# Patient Record
Sex: MALE | Race: AMERICAN INDIAN OR ALASKA NATIVE
[De-identification: names, ages, dates, MRNs, and addresses within clinical notes are randomized per-mention and may not be internally consistent; named-entity substitution may affect disease eponyms.]

---

## 2019-03-07 ENCOUNTER — HOSPITAL ENCOUNTER (EMERGENCY)
Dept: HOSPITAL 5 - ED | Age: 49
Discharge: HOME | End: 2019-03-07
Payer: COMMERCIAL

## 2019-03-07 VITALS — DIASTOLIC BLOOD PRESSURE: 76 MMHG | SYSTOLIC BLOOD PRESSURE: 124 MMHG

## 2019-03-07 DIAGNOSIS — L02.215: Primary | ICD-10-CM

## 2019-03-07 NOTE — EMERGENCY DEPARTMENT REPORT
Abscess Boil HPI





- HPI


Chief Complaint: Skin/Abscess/Foreign Body


Stated Complaint: RT SIDE BOIL ON BUTT/PAIN


Time Seen by Provider: 03/07/19 20:20


Duration: 3 Days


Location: Sacral/Pilonidal


History: No Fever, No Pain, No Purulent Drainage, No Numbness, No Foreign Body, 

No Previous History, No Insect Bite


HPI: 48-year-old -American male presents to the emergency room for bolt 

to his buttocks 2 days.  Patient reports he's been using over-the-counter 

medication that is not working.  Patient has taken nothing for pain.  He denies 

any fever or chills no purulent discharge abdominal pain.  Patient has no past 

medical history currently takes no medications on a daily basis and has no known

drug allergies.


Home Medications: 


                                  Previous Rx's











 Medication  Instructions  Recorded  Last Taken  Type


 


Cephalexin [Keflex] 500 mg PO BID #20 capsule 03/07/19 Unknown Rx


 


Ibuprofen [Motrin 600 MG tab] 600 mg PO Q8H PRN #15 tablet 03/07/19 Unknown Rx











Allergies/Adverse Reactions: 


                                    Allergies











Allergy/AdvReac Type Severity Reaction Status Date / Time


 


No Known Allergies Allergy   Unverified 03/07/19 17:42














ED Review of Systems


ROS: 


Stated complaint: RT SIDE BOIL ON BUTT/PAIN


Other details as noted in HPI





Comment: All other systems reviewed and negative


Skin: lesions





ED Past Medical Hx





- Past Medical History


Previous Medical History?: No





- Surgical History


Past Surgical History?: No





- Social History


Smoking Status: Never Smoker


Substance Use Type: None





- Medications


Home Medications: 


                                Home Medications











 Medication  Instructions  Recorded  Confirmed  Last Taken  Type


 


Cephalexin [Keflex] 500 mg PO BID #20 capsule 03/07/19  Unknown Rx


 


Ibuprofen [Motrin 600 MG tab] 600 mg PO Q8H PRN #15 tablet 03/07/19  Unknown Rx














ED Abscess Boil Physical Exam





- Exam


General: 


Vital signs noted. No distress. Alert and acting appropriately.





Size: 3 cm


Exam: Yes Tenderness, Yes Normal Neurologic Exam, Yes Normal Circulation, No 

Fluctuance, No Surrounding Cellulites/Erythema, No Lymphangitis, No Crepitation,

No Heart Murmur





I & D Note





- I & D Note


I & D Note: DATE OF PROCEDURE: 03/07/2019.  PREOPERATIVE DIAGNOSES:  1.soft 

tissue infection perineum.  2.  POSTOPERATIVE DIAGNOSES:  1.  2. .........soft 

tissue infection. Infection appeared to be contained to subcutaneous tissue and 

there was no evidence of necrotizing soft tissue infection including 

myonecrosis.  OPERATION PERFORMED:  Incision and drainage of ..... soft tissue 

abscess.  Provider: Venkat Salomon PA-C.  ANESTHESIA: Local.  DESCRIPTION OF 

PROCEDURE: The patient was prepped and draped. Seropurulent, somewhat bloody 

fluid was noted. The infection appeared contained to a golf ping pong sized area

in the subcutaneous tissues above the fascia. There was no evidence of 

myonecrosis, penetration of the fascia or significant extent along the fascia of

the infection. We cleaned the area with Betadine and on he was able to express 

small amount of purulent discharge .......... Dry dressings were applied. The 

patient appeared to tolerate the procedure well.  

-----------------------------------------





ED Course


                                   Vital Signs











  03/07/19





  17:49


 


Temperature 98.6 F


 


Pulse Rate 74


 


Respiratory 16





Rate 


 


Blood Pressure 124/76





[Left] 


 


O2 Sat by Pulse 99





Oximetry 











Critical care attestation.: 


If time is entered above; I have spent that time in minutes in the direct care 

of this critically ill patient, excluding procedure time.








ED Medical Decision Making





- Medical Decision Making





Patient has been evaluated by this provider in [facility].


Ibuprofen was given for pain management.


Incision and drainage was attempted with not much drainage.


Discussed the patient to complete antibiotics as prescribed pain medication as 

needed and to sit in a warm tub with little Epsom salts and this would help pull

 the abscess material.  Discussed patient if it feels worse to return back to 

the emergency room for second attempt to incision and drain abscess.  Patient 

verbalizes understanding.





ED Disposition


Clinical Impression: 


 Abscess of perineum





Disposition: DC-01 TO HOME OR SELFCARE


Is pt being admited?: No


Does the pt Need Aspirin: No


Condition: Stable


Instructions:  Abscess (ED), Incision and Drainage (ED)


Additional Instructions: 


Complete antibiotics as prescribed and take pain medication as needed.  Please 

sit in warm to hot water with Epsom salt to help her abscess come to a full 

head.  He can return back to the emergency room in 3-5 days if he felt that he 

needs to be drained.


Prescriptions: 


Cephalexin [Keflex] 500 mg PO BID #20 capsule


Ibuprofen [Motrin 600 MG tab] 600 mg PO Q8H PRN #15 tablet


 PRN Reason: Pain , Severe (7-10)


Referrals: 


[facility] [Other] - 3-5 Days


Forms:  Work/School Release Form(ED)